# Patient Record
Sex: MALE | Race: ASIAN | Employment: PART TIME | URBAN - METROPOLITAN AREA
[De-identification: names, ages, dates, MRNs, and addresses within clinical notes are randomized per-mention and may not be internally consistent; named-entity substitution may affect disease eponyms.]

---

## 2020-12-08 ENCOUNTER — OFFICE VISIT (OUTPATIENT)
Dept: NEUROLOGY | Age: 25
End: 2020-12-08
Payer: COMMERCIAL

## 2020-12-08 VITALS
OXYGEN SATURATION: 98 % | HEART RATE: 73 BPM | WEIGHT: 204 LBS | BODY MASS INDEX: 33.99 KG/M2 | RESPIRATION RATE: 22 BRPM | SYSTOLIC BLOOD PRESSURE: 112 MMHG | TEMPERATURE: 96.7 F | HEIGHT: 65 IN | DIASTOLIC BLOOD PRESSURE: 80 MMHG

## 2020-12-08 DIAGNOSIS — R56.9 SEIZURE (HCC): Primary | ICD-10-CM

## 2020-12-08 PROCEDURE — 99203 OFFICE O/P NEW LOW 30 MIN: CPT | Performed by: STUDENT IN AN ORGANIZED HEALTH CARE EDUCATION/TRAINING PROGRAM

## 2020-12-08 RX ORDER — CHOLECALCIFEROL (VITAMIN D3) 50 MCG
CAPSULE ORAL
COMMUNITY

## 2020-12-08 RX ORDER — LEVETIRACETAM 750 MG/1
TABLET ORAL
COMMUNITY
Start: 2020-12-03 | End: 2020-12-08 | Stop reason: DRUGHIGH

## 2020-12-08 RX ORDER — ERGOCALCIFEROL 1.25 MG/1
CAPSULE ORAL
COMMUNITY
Start: 2020-09-24

## 2020-12-08 RX ORDER — LEVETIRACETAM 750 MG/1
750 TABLET ORAL 2 TIMES DAILY
Qty: 60 TAB | Refills: 3 | Status: SHIPPED | OUTPATIENT
Start: 2020-12-08 | End: 2021-01-07

## 2020-12-08 NOTE — PROGRESS NOTES
Kamille Castorena is a 31 y.o. male . presents for New Patient; Seizure; and Other (autism)   . A 31years old male patient with history of autism and seizure disorder currently on Keppra (500 mg p.o. twice daily) here for evaluation. He has been having seizures for about 4 years. Get worse over the past 1 year and has been started on Keppra about a year ago. His last seizure was in October 2020 (October 8). Seizure description is generalized tonic-clonic, with tongue bite, foaming from his mouth, with occasional incontinence. Duration is about 1 minutes with significant postictal confusion lasting for few minutes. Sometimes he goes to sleep. He feels weak all over after his seizures. He does not have any obvious warning symptoms. During his last visit to the emergency room at [facility], he had a seizure while in the car; he developed a posterior dislocation of the left shoulder which was treated by orthopedics. At that time, his discharge medication includes Keppra 1000 mg p.o. twice daily. But, mother is giving him 500 mg p.o. per day until about yesterday when she increase the dose to 500 mg p.o. twice daily. Current seizure frequency is about once every other month. Patient has autism but he is able to talk and communicate well with his family members. He has completed a school. Used to work at [employer] but stopped it because of his seizures. He spends a lot of time playing video games. He goes to sleep very late. Had a history of trauma to the head as a child when he fell from his father shoulder; no obvious loss of consciousness. No past history of bone infection. Family history includes a cousin with similar. No history of febrile seizure. No previous EEG or MRI. Had a CT scan of his head from June 2020 which was unremarkable. Review of Systems   Reason unable to perform ROS: Some limitation because of autism. Constitutional: Negative for chills, fever and weight loss. HENT: Negative for hearing loss. Eyes: Negative for blurred vision and double vision. Respiratory: Negative for cough and shortness of breath. Cardiovascular: Negative for chest pain and leg swelling. Gastrointestinal: Negative for nausea and vomiting. Genitourinary: Negative for dysuria and urgency. Musculoskeletal: Negative for back pain, falls and neck pain. Skin: Positive for itching and rash. Neurological: Positive for speech change (poor communication from autism). Negative for tremors, weakness and headaches. No past medical history on file. No past surgical history on file. No family history on file.      Social History     Socioeconomic History    Marital status: SINGLE     Spouse name: Not on file    Number of children: Not on file    Years of education: Not on file    Highest education level: Not on file   Occupational History    Not on file   Social Needs    Financial resource strain: Not on file    Food insecurity     Worry: Not on file     Inability: Not on file    Transportation needs     Medical: Not on file     Non-medical: Not on file   Tobacco Use    Smoking status: Never Smoker    Smokeless tobacco: Never Used   Substance and Sexual Activity    Alcohol use: Not Currently    Drug use: Never    Sexual activity: Not on file   Lifestyle    Physical activity     Days per week: Not on file     Minutes per session: Not on file    Stress: Not on file   Relationships    Social connections     Talks on phone: Not on file     Gets together: Not on file     Attends Rastafari service: Not on file     Active member of club or organization: Not on file     Attends meetings of clubs or organizations: Not on file     Relationship status: Not on file    Intimate partner violence     Fear of current or ex partner: Not on file     Emotionally abused: Not on file     Physically abused: Not on file     Forced sexual activity: Not on file   Other Topics Concern    Not on file   Social History Narrative    Not on file        Allergies not on file      Current Outpatient Medications   Medication Sig Dispense Refill    levETIRAcetam (KEPPRA) 750 mg tablet TK 1 T PO BID      ergocalciferol (ERGOCALCIFEROL) 1,250 mcg (50,000 unit) capsule TK ONE C PO ONCE A WEEK WITH FOOD      B.infantis-B.ani-B.long-B.bifi (Probiotic 4X) 10-15 mg TbEC Take  by mouth. Physical Exam  Constitutional:       Appearance: Normal appearance. HENT:      Head: Normocephalic and atraumatic. Mouth/Throat:      Mouth: Mucous membranes are moist.      Pharynx: Oropharynx is clear. No oropharyngeal exudate. Eyes:      Extraocular Movements: Extraocular movements intact. Pupils: Pupils are equal, round, and reactive to light. Neck:      Musculoskeletal: Normal range of motion and neck supple. Pulmonary:      Effort: Pulmonary effort is normal. No respiratory distress. Musculoskeletal: Normal range of motion. Right lower leg: No edema. Left lower leg: No edema. Neurological:      Mental Status: He is alert. Comments: Mental status: Awake, alert, oriented to self, his , place, Day/month/date/year and person; know the president; x3, follows simple and complex commands, no neglect. Poor eye contact. Speech and languge: fluent, coherent,  and comprehension intact  CN: VFF, EOMI, PERRLA, face sensation intact , no facial asymmetry noted, palate elevation symmetric bilat, SS+SCM 5/5 bilat, tongue midline  Motor: no pronator drift, tone normal throughout, strength 5/5 throughout  Sensory: intact to light touch and PP  throughout  Coordination: FNF, HS accurate w/o dysmetria  DTR: 2+ throughout, toes downgoing BL  Gait: normal.             No visits with results within 3 Month(s) from this visit. Latest known visit with results is:   No results found for any previous visit.              ICD-10-CM ICD-9-CM    1. Seizure ([facility] [address]  R56.9 780.39 levETIRAcetam (KEPPRA) 750 mg tablet      EEG AWAKE AND ASLEEP      MRI BRAIN WO CONT     A 31years old male patient with autism here for evaluation of seizure. Has been having seizures for the past 4 years and no medication for the past 1 year. Currently has seizures every other month. Description of the seizure is generalized tonic-clonic with tongue bite and incontinence. Also had sustained trauma to his head during seizures. Had a shoulder dislocation in October from seizure. Has not been taking his seizure medications as prescribed: Last order from October was 1000 mg p.o. twice daily; but patient was taking 500 mg p.o. per day until yesterday when the dose was increased to 500 mg p.o. twice daily. Since she had seizures despite his Keppra, will increase the dose to 750 mg p.o. twice daily. In addition, will get EEG and MRI of the brain. Discussed seizure precautions including not working on a roof tops, avoiding climbing ladders, working on open fires, and swimming. Patient is not currently driving. We will see him in 3 months time.

## 2020-12-08 NOTE — LETTER
12/8/20 Patient: Lilia Pedersen YOB: 1995 Date of Visit: 12/8/2020 Sylvia Grande NP 
[address] [facility] [address] [address] VIA Facsimile: [phone] Huey Barnhart NP 
[address] [id number] VIA Facsimile: [phone] Dear ERICH Matta NP, Thank you for referring Mr. Lilia Pedersen to [facility] for evaluation. My notes for this consultation are attached. If you have questions, please do not hesitate to call me. I look forward to following your patient along with you. Sincerely, Brayan Hager MD

## 2020-12-15 DIAGNOSIS — R56.9 SEIZURE (HCC): ICD-10-CM

## 2021-01-05 ENCOUNTER — HOSPITAL ENCOUNTER (OUTPATIENT)
Dept: NEUROLOGY | Age: 26
Discharge: HOME | End: 2021-01-05
Attending: STUDENT IN AN ORGANIZED HEALTH CARE EDUCATION/TRAINING PROGRAM
Payer: COMMERCIAL

## 2021-01-05 ENCOUNTER — HOSPITAL ENCOUNTER (OUTPATIENT)
Dept: MRI IMAGING | Age: 26
Discharge: HOME | End: 2021-01-05
Attending: STUDENT IN AN ORGANIZED HEALTH CARE EDUCATION/TRAINING PROGRAM
Payer: COMMERCIAL

## 2021-01-05 PROCEDURE — 70551 MRI BRAIN STEM W/O DYE: CPT

## 2021-01-05 PROCEDURE — 95816 EEG AWAKE AND DROWSY: CPT

## 2021-01-16 NOTE — PROCEDURES
[address] [place] [address] Dr NICHOLAS    Name:  Cassie Mg  MR#:   [id number]  :  1995  ACCOUNT #:  [de-identified]  DATE OF SERVICE:  2021    OUTPATIENT RECORDING    EEG Number:  AMANDA EEG [id number]    REFERRING PROVIDER:  EEG was ordered by Dr. Remy Fraser. REASON FOR EEG:  For evaluation of seizures. MEDICATIONS:  The patient's current medications include Keppra. EEG TECHNIQUE USED:  Standard 10-20 system with 21-channel recording and an EKG. Activation procedure used was photic stimulation. The patient did not sleep during the recording. Total duration of the EEG was 27 minutes. EEG REPORT:  The background consists of posterior dominant alpha rhythms at a frequency of 9-10 Hz and attenuates to eye opening. No asymmetry between the right and left sides. There are no obvious spikes or sharp wave discharges. No focal slowing. Photic stimulation did not induce any abnormal discharges. No sleep-related changes. IMPRESSION:  This is a normal EEG. CLINICAL CORRELATION:  A normal EEG does not rule out the possibility of seizures or epilepsy.       MD WESLEY [id number]  D:  01/15/2021 17:29  T:  2021 2:12  JOB #:  [id number]

## 2021-07-20 ENCOUNTER — OFFICE VISIT (OUTPATIENT)
Dept: NEUROLOGY | Age: 26
End: 2021-07-20
Payer: COMMERCIAL

## 2021-07-20 ENCOUNTER — HOSPITAL ENCOUNTER (OUTPATIENT)
Dept: LAB | Age: 26
Discharge: HOME | End: 2021-07-20
Payer: COMMERCIAL

## 2021-07-20 VITALS
WEIGHT: 214.4 LBS | OXYGEN SATURATION: 97 % | HEIGHT: 65 IN | BODY MASS INDEX: 35.72 KG/M2 | RESPIRATION RATE: 18 BRPM | DIASTOLIC BLOOD PRESSURE: 72 MMHG | HEART RATE: 95 BPM | SYSTOLIC BLOOD PRESSURE: 108 MMHG | TEMPERATURE: 97.3 F

## 2021-07-20 DIAGNOSIS — E23.6 PITUITARY MASS (HCC): ICD-10-CM

## 2021-07-20 DIAGNOSIS — R56.9 SEIZURE (HCC): ICD-10-CM

## 2021-07-20 DIAGNOSIS — R56.9 SEIZURE (HCC): Primary | ICD-10-CM

## 2021-07-20 LAB — TSH SERPL DL<=0.05 MIU/L-ACNC: 2 UIU/ML (ref 0.36–3.74)

## 2021-07-20 PROCEDURE — 84146 ASSAY OF PROLACTIN: CPT

## 2021-07-20 PROCEDURE — 36415 COLL VENOUS BLD VENIPUNCTURE: CPT

## 2021-07-20 PROCEDURE — 82024 ASSAY OF ACTH: CPT

## 2021-07-20 PROCEDURE — 80177 DRUG SCRN QUAN LEVETIRACETAM: CPT

## 2021-07-20 PROCEDURE — 84443 ASSAY THYROID STIM HORMONE: CPT

## 2021-07-20 PROCEDURE — 84305 ASSAY OF SOMATOMEDIN: CPT

## 2021-07-20 PROCEDURE — 82533 TOTAL CORTISOL: CPT

## 2021-07-20 PROCEDURE — 99214 OFFICE O/P EST MOD 30 MIN: CPT | Performed by: STUDENT IN AN ORGANIZED HEALTH CARE EDUCATION/TRAINING PROGRAM

## 2021-07-20 PROCEDURE — 83003 ASSAY GROWTH HORMONE (HGH): CPT

## 2021-07-20 RX ORDER — LEVETIRACETAM 750 MG/1
750 TABLET ORAL 2 TIMES DAILY
Qty: 60 TABLET | Refills: 6 | Status: SHIPPED | OUTPATIENT
Start: 2021-07-20 | End: 2021-08-19

## 2021-07-20 RX ORDER — LEVETIRACETAM 750 MG/1
TABLET ORAL
COMMUNITY
Start: 2021-06-21 | End: 2021-07-20 | Stop reason: SDUPTHER

## 2021-07-20 NOTE — PROGRESS NOTES
Sergio Peña is a 31 y.o. male . presents for Seizure and Medication Refill   . A 31years old male patient with autism spectrum disorder and seizure here for follow-up of his seizure. Last seen in the clinic in December 2020. He came today with his mother. According to her, he had an episode of seizure on July 15, 2021 which was witnessed by her boyfriend: He had a generalized convulsion with tongue bite; unknown duration. He was started after the seizure. She is not sure about the frequency of his seizure but that was the only witnessed seizure since his last visit. He is taking his Keppra 750 mg p.o. twice daily. Mother arranges the pills but sometimes he might forget. He currently works part-time at [employer]. Some problems with communication. During his last visit, and EEG and MRI were ordered. The EEG from January 2021 did not show any epileptiform discharges. MRI of the brain done in January showed a 3 mm T2 hyperintense cystic-appearing focus in the midline anterior pituitary gland; possibility of Rathke cyst was considered; the differential was cystic pituitary microadenoma. From previous encounter  A 31years old male patient with history of autism and seizure disorder currently on Keppra (500 mg p.o. twice daily) here for evaluation. He has been having seizures for about 4 years. Get worse over the past 1 year and has been started on Keppra about a year ago. His last seizure was in October 2020 (October 8). Seizure description is generalized tonic-clonic, with tongue bite, foaming from his mouth, with occasional incontinence. Duration is about 1 minutes with significant postictal confusion lasting for few minutes. Sometimes he goes to sleep. He feels weak all over after his seizures. He does not have any obvious warning symptoms.   During his last visit to the emergency room at [facility], he had a seizure while in the car; he developed a posterior dislocation of the left shoulder which was treated by orthopedics. At that time, his discharge medication includes Keppra 1000 mg p.o. twice daily. But, mother is giving him 500 mg p.o. per day until about yesterday when she increase the dose to 500 mg p.o. twice daily. Current seizure frequency is about once every other month. Patient has autism but he is able to talk and communicate well with his family members. He has completed a school. Used to work at [employer] but stopped it because of his seizures. He spends a lot of time playing video games. He goes to sleep very late. Had a history of trauma to the head as a child when he fell from his father shoulder; no obvious loss of consciousness. No past history of bone infection. Family history includes a cousin with similar. No history of febrile seizure. No previous EEG or MRI. Had a CT scan of his head from June 2020 which was unremarkable. Seizure   Pertinent negatives include no headaches, no chest pain, no cough, no nausea and no vomiting. He reports no chest pain, no vomiting, no headaches, no cough. Medication Refill  Pertinent negatives include no chest pain, no headaches and no shortness of breath. Review of Systems   Reason unable to perform ROS: Some limitation because of autism. Constitutional: Negative for chills, fever and weight loss. HENT: Negative for hearing loss. Eyes: Negative for blurred vision and double vision. Respiratory: Negative for cough and shortness of breath. Cardiovascular: Negative for chest pain and leg swelling. Gastrointestinal: Negative for nausea and vomiting. Genitourinary: Negative for dysuria and urgency. Musculoskeletal: Negative for back pain and neck pain. Skin: Positive for itching and rash. Neurological: Positive for speech change (poor communication from autism) and seizures (Last seizure was July 15). Negative for tremors, weakness and headaches. History reviewed.  No pertinent past medical history. History reviewed. No pertinent surgical history. History reviewed. No pertinent family history. Social History     Socioeconomic History    Marital status: SINGLE     Spouse name: Not on file    Number of children: Not on file    Years of education: Not on file    Highest education level: Not on file   Occupational History    Not on file   Tobacco Use    Smoking status: Never Smoker    Smokeless tobacco: Never Used   Substance and Sexual Activity    Alcohol use: Not Currently    Drug use: Never    Sexual activity: Not on file   Other Topics Concern    Not on file   Social History Narrative    Not on file     Social Determinants of Health     Financial Resource Strain:     Difficulty of Paying Living Expenses:    Food Insecurity:     Worried About Running Out of Food in the Last Year:     [address] in the Last Year:    Transportation Needs:     Lack of Transportation (Medical):  Lack of Transportation (Non-Medical):    Physical Activity:     Days of Exercise per Week:     Minutes of Exercise per Session:    Stress:     Feeling of Stress :    Social Connections:     Frequency of Communication with Friends and Family:     Frequency of Social Gatherings with Friends and Family:     Attends Alevism Services:     Active Member of Clubs or Organizations:     Attends Club or Organization Meetings:     Marital Status:    Intimate Partner Violence:     Fear of Current or Ex-Partner:     Emotionally Abused:     Physically Abused:     Sexually Abused:         No Known Allergies      Current Outpatient Medications   Medication Sig Dispense Refill    levETIRAcetam (KEPPRA) 750 mg tablet Take 1 Tablet by mouth two (2) times a day for 30 days. 60 Tablet 6    ergocalciferol (ERGOCALCIFEROL) 1,250 mcg (50,000 unit) capsule TK ONE C PO ONCE A WEEK WITH FOOD      B.infantis-B.ani-B.long-B.bifi (Probiotic 4X) 10-15 mg TbEC Take  by mouth.            Physical Exam  Constitutional:       Appearance: Normal appearance. HENT:      Head: Normocephalic and atraumatic. Mouth/Throat:      Mouth: Mucous membranes are moist.      Pharynx: Oropharynx is clear. No oropharyngeal exudate. Eyes:      Extraocular Movements: Extraocular movements intact. Pupils: Pupils are equal, round, and reactive to light. Pulmonary:      Effort: Pulmonary effort is normal. No respiratory distress. Musculoskeletal:         General: Normal range of motion. Cervical back: Normal range of motion and neck supple. Right lower leg: No edema. Left lower leg: No edema. Neurological:      Mental Status: He is alert. Comments: Mental status: Awake, alert, oriented x3, follows simple and complex commands, no neglect. Poor eye contact. Speech and languge: fluent, coherent,  and comprehension intact  CN: VFF, EOMI, PERRLA, face sensation intact , no facial asymmetry noted, palate elevation symmetric bilat, SS+SCM 5/5 bilat, tongue midline  Motor: no pronator drift, tone normal throughout, strength 5/5 throughout  Sensory: intact to light touch and PP  throughout  Coordination: FNF, HS accurate w/o dysmetria  DTR: 2+ throughout, toes downgoing BL  Gait: normal.             Hospital Outpatient Visit on 07/20/2021   Component Date Value Ref Range Status    TSH 07/20/2021 2.00  0.36 - 3.74 uIU/mL Final    Cortisol, random 07/20/2021 14.9  ug/dL Final    No reference range has been established.  Prolactin 07/20/2021 7.4  ng/mL Final    Males:            2.1-17.7    ng/mL     Study Result    Narrative & Impression   MRI BRAIN WITHOUT CONTRAST     PROVIDED REASON FOR EXAM: Evaluation of seizure  Additional History: History of autism, seizure disorder. Tonic-clonic seizures  for 4 years  Comparison Studies: No prior brain imaging available.  Report for CT head  6/17/2020 available in Care Everywhere     Imaging Technique:     Sequences:  Sagittal,  Coronal and axial T1 weighted, Diffusion Weighted  (DWI), Axial T2 weighted, Axial T2 FLAIR, and SWI/GRE MRI images of the brain. Additional thin coronal oblique T2 FSE and T2 FLAIR images through the temporal  lobes.     Contrast Material:  NONE     Limiting Factors/Major Artifacts: None.     FINDINGS:     Brain Parenchyma: Negative for acute infarct. Cerebral white matter is normal.  There are tiny T2 hyperintense defects in the left and right centrum semiovale. Visible as a well-defined T1 hypointense defect on sagittal and axial T1, no  significant surrounding gliosis on T2 FLAIR. May be enlarged perivascular space. No chronic cortical infarcts or chronic lacunar infarcts. No visible masses or  midline shift.     Dedicated coronal oblique images through the temporal lobes and whole brain. Symmetric appearance of the mesial temporal lobes, no findings of hippocampal  sclerosis. No findings of seizure focus. No visible heterotopia or cortical  dysplasia.     CSF Spaces:  Normal in size and morphology for the patient's age. No  hydrocephalus.     Vascular System:  Grossly patent flow in basilar and internal cerebral arteries. No findings of dural sinus thrombosis.      Hemorrhage:  No acute hemorrhage. No chronic microhemorrhage.     Other Structures:     Calvarium: No suspicious marrow signal.     Sella: Pituitary is not enlarged. There is a 3 mm T2 hyperintense signal focus  in the midline of the anterior pituitary seen on coronal T2 series 12 image 38. Slightly hyperintense on T2 FLAIR. Visualized Upper Cervical Spine: Cerebellar tonsils extend 5 mm below the  foramen magnum. The tonsils do not compress the cervical medullary junction. Not  significant crowding of the foramen magnum. Orbits: Grossly normal  Paranasal Sinuses: No significant paranasal sinus disease. Mastoid Air Cells:  Clear.     IMPRESSION  IMPRESSION:     1. Negative for acute infarct, acute hemorrhage, or mass.   2. No focal brain abnormality to explain seizures. 3. Small T2 hyperintense linear signal foci in the left and right frontal white  matter most consistent with incidental enlarged perivascular spaces. 4. 3 mm T2 hyperintense cystic-appearing focus in the midline anterior pituitary  gland. Nonspecific with routine noncontrast brain MRI. Could represent a benign  pituitary cyst such as Rathke's cleft cyst. A cystic pituitary microadenoma is  possible, typically seen more laterally within the pituitary gland, however. Pituitary-related endocrine labs to exclude a functioning adenoma             ICD-10-CM ICD-9-CM    1. Seizure ([address]  R56.9 780.39 levETIRAcetam (KEPPRA) 750 mg tablet      LEVETIRACETAM (KEPPRA)   2. Pituitary mass (HCC)  E23.6 253.8 ACTH      TSH 3RD GENERATION      CORTISOL      INSULIN-LIKE GROWTH FACTOR 1      GROWTH HORMONE      PROLACTIN     [age]years old male patient here for follow-up of seizure. Currently on Keppra 750 mg p.o. twice daily. Had one seizure on July 15, 2021 which was described as generalized convulsion with tongue bite and postictal confusion. Questionable compliance and mother mentions that he sometimes might forget. We will check his Keppra level. 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 Will get pituitary hormone levels. Patient is not currently driving. We will see him in 6 months time but will call him with the result of the hormonal studies.

## 2021-07-20 NOTE — PROGRESS NOTES
Shannon Gallegos is a 31 y.o. male patient in office today, accompanied by mother for follow-up on seizures and medication refills. 1. Have you been to the ER, urgent care clinic since your last visit? Hospitalized since your last visit? No    2. Have you seen or consulted any other health care providers outside of the [address] [place] [address] since your last visit? Include any pap smears or colon screening.  No

## 2021-07-21 LAB
ACTH PLAS-MCNC: 32.5 PG/ML (ref 7.2–63.3)
CORTIS SERPL-MCNC: 14.9 UG/DL
GH SERPL-MCNC: <0.1 NG/ML (ref 0–10)
IGF-I SERPL-MCNC: 143 NG/ML (ref 101–307)
PROLACTIN SERPL-MCNC: 7.4 NG/ML

## 2021-07-22 LAB — LEVETIRACETAM SERPL-MCNC: 15 UG/ML (ref 10–40)

## 2022-03-03 RX ORDER — LEVETIRACETAM 750 MG/1
750 TABLET ORAL 2 TIMES DAILY
COMMUNITY

## 2024-05-09 ENCOUNTER — HOSPITAL ENCOUNTER (OUTPATIENT)
Facility: HOSPITAL | Age: 29
Setting detail: SPECIMEN
Discharge: HOME | End: 2024-05-09
Payer: COMMERCIAL

## 2024-05-09 ENCOUNTER — OFFICE VISIT (OUTPATIENT)
Dept: FAMILY MEDICINE CLINIC | Facility: CLINIC | Age: 29
End: 2024-05-09

## 2024-05-09 VITALS
BODY MASS INDEX: 31.58 KG/M2 | HEIGHT: 64 IN | TEMPERATURE: 97.2 F | HEART RATE: 67 BPM | RESPIRATION RATE: 14 BRPM | WEIGHT: 185 LBS | DIASTOLIC BLOOD PRESSURE: 82 MMHG | OXYGEN SATURATION: 95 % | SYSTOLIC BLOOD PRESSURE: 119 MMHG

## 2024-05-09 DIAGNOSIS — G40.909 SEIZURE DISORDER (HCC): ICD-10-CM

## 2024-05-09 DIAGNOSIS — Z76.89 ESTABLISHING CARE WITH NEW DOCTOR, ENCOUNTER FOR: Primary | ICD-10-CM

## 2024-05-09 DIAGNOSIS — Z76.89 ESTABLISHING CARE WITH NEW DOCTOR, ENCOUNTER FOR: ICD-10-CM

## 2024-05-09 DIAGNOSIS — F84.0 AUTISM: ICD-10-CM

## 2024-05-09 LAB
ANION GAP SERPL CALC-SCNC: 5 MMOL/L (ref 3–18)
BUN SERPL-MCNC: 11 MG/DL (ref 7–18)
BUN/CREAT SERPL: 11 (ref 12–20)
CALCIUM SERPL-MCNC: 9.5 MG/DL (ref 8.5–10.1)
CHLORIDE SERPL-SCNC: 105 MMOL/L (ref 100–111)
CHOLEST SERPL-MCNC: 154 MG/DL
CO2 SERPL-SCNC: 29 MMOL/L (ref 21–32)
CREAT SERPL-MCNC: 1.01 MG/DL (ref 0.6–1.3)
ERYTHROCYTE [DISTWIDTH] IN BLOOD BY AUTOMATED COUNT: 12 % (ref 11.6–14.5)
EST. AVERAGE GLUCOSE BLD GHB EST-MCNC: 105 MG/DL
GLUCOSE SERPL-MCNC: 92 MG/DL (ref 74–99)
HBA1C MFR BLD: 5.3 % (ref 4.2–5.6)
HCT VFR BLD AUTO: 49.7 % (ref 36–48)
HDLC SERPL-MCNC: 52 MG/DL (ref 40–60)
HDLC SERPL: 3 (ref 0–5)
HGB BLD-MCNC: 17.1 G/DL (ref 13–16)
LDLC SERPL CALC-MCNC: 81.2 MG/DL (ref 0–100)
LIPID PANEL: NORMAL
MCH RBC QN AUTO: 31.3 PG (ref 24–34)
MCHC RBC AUTO-ENTMCNC: 34.4 G/DL (ref 31–37)
MCV RBC AUTO: 91 FL (ref 78–100)
NRBC # BLD: 0 K/UL (ref 0–0.01)
NRBC BLD-RTO: 0 PER 100 WBC
PLATELET # BLD AUTO: 317 K/UL (ref 135–420)
PMV BLD AUTO: 10.2 FL (ref 9.2–11.8)
POTASSIUM SERPL-SCNC: 3.8 MMOL/L (ref 3.5–5.5)
RBC # BLD AUTO: 5.46 M/UL (ref 4.35–5.65)
SODIUM SERPL-SCNC: 139 MMOL/L (ref 136–145)
TRIGL SERPL-MCNC: 104 MG/DL
VLDLC SERPL CALC-MCNC: 20.8 MG/DL
WBC # BLD AUTO: 6.2 K/UL (ref 4.6–13.2)

## 2024-05-09 PROCEDURE — 83036 HEMOGLOBIN GLYCOSYLATED A1C: CPT

## 2024-05-09 PROCEDURE — 80061 LIPID PANEL: CPT

## 2024-05-09 PROCEDURE — 80048 BASIC METABOLIC PNL TOTAL CA: CPT

## 2024-05-09 PROCEDURE — 36415 COLL VENOUS BLD VENIPUNCTURE: CPT

## 2024-05-09 PROCEDURE — 85027 COMPLETE CBC AUTOMATED: CPT

## 2024-05-09 PROCEDURE — 87389 HIV-1 AG W/HIV-1&-2 AB AG IA: CPT

## 2024-05-09 RX ORDER — LEVETIRACETAM 750 MG/1
750 TABLET ORAL 2 TIMES DAILY
Qty: 180 TABLET | Refills: 1 | Status: SHIPPED | OUTPATIENT
Start: 2024-05-09

## 2024-05-09 SDOH — ECONOMIC STABILITY: FOOD INSECURITY
WITHIN THE PAST 12 MONTHS, YOU WORRIED THAT YOUR FOOD WOULD RUN OUT BEFORE YOU GOT MONEY TO BUY MORE.: PATIENT UNABLE TO ANSWER

## 2024-05-09 SDOH — ECONOMIC STABILITY: HOUSING INSECURITY
IN THE LAST 12 MONTHS, WAS THERE A TIME WHEN YOU DID NOT HAVE A STEADY PLACE TO SLEEP OR SLEPT IN A SHELTER (INCLUDING NOW)?: PATIENT UNABLE TO ANSWER

## 2024-05-09 SDOH — ECONOMIC STABILITY: FOOD INSECURITY
WITHIN THE PAST 12 MONTHS, THE FOOD YOU BOUGHT JUST DIDN'T LAST AND YOU DIDN'T HAVE MONEY TO GET MORE.: PATIENT UNABLE TO ANSWER

## 2024-05-09 ASSESSMENT — PATIENT HEALTH QUESTIONNAIRE - PHQ9
SUM OF ALL RESPONSES TO PHQ QUESTIONS 1-9: 0
SUM OF ALL RESPONSES TO PHQ QUESTIONS 1-9: 0
1. LITTLE INTEREST OR PLEASURE IN DOING THINGS: NOT AT ALL
SUM OF ALL RESPONSES TO PHQ QUESTIONS 1-9: 0
SUM OF ALL RESPONSES TO PHQ QUESTIONS 1-9: 0
2. FEELING DOWN, DEPRESSED OR HOPELESS: NOT AT ALL
DEPRESSION UNABLE TO ASSESS: FUNCTIONAL CAPACITY MOTIVATION LIMITS ACCURACY
SUM OF ALL RESPONSES TO PHQ9 QUESTIONS 1 & 2: 0

## 2024-05-09 ASSESSMENT — ENCOUNTER SYMPTOMS
NAUSEA: 0
VOMITING: 0
COUGH: 0
DIARRHEA: 0
SHORTNESS OF BREATH: 0
CHEST TIGHTNESS: 0

## 2024-05-09 NOTE — PROGRESS NOTES
Fortunato Tolentino is a 29 y.o. male (: 1995) presenting to address:    Chief Complaint   Patient presents with    New Patient       Vitals:    24 1340   BP: 119/82   Pulse: 67   Resp: 14   Temp: 97.2 °F (36.2 °C)   SpO2: 95%       \"Have you been to the ER, urgent care clinic since your last visit?  Hospitalized since your last visit?\"    NO    “Have you seen or consulted any other health care providers outside of [facility] since your last visit?”    NO

## 2024-05-09 NOTE — PROGRESS NOTES
[facility]    HISTORY OF PRESENT ILLNESS  Fortunato Tolentino  is a 29 y.o. y.o. male here to establish care.    Seizures  -taking keppra 750  -never followed neurology  -started about 7 years ago     Hx of autism    Health Maintenance:    Depression Screen:       5/9/2024     1:39 PM   PHQ-9    Depression Unable to Assess Functional capacity motivation limits accuracy   Little interest or pleasure in doing things 0   Feeling down, depressed, or hopeless 0   PHQ-2 Score 0   PHQ-9 Total Score 0        HCV Screen:   No results found for: \"HEPCAB\"     Colon Cancer Screening: n/a  Prostate Cancer Screening:       Smoking Status:   Tobacco Use      Smoking status: Never      Smokeless tobacco: Never     Lung Cancer Screening:  CT Low Dose n/a  AAA Screening: n/a    Exercise:  Diet:    Immunizations:  Flu vaccine- Recommended every fall  COVID vaccine primary series- complete  Tetanus- Tdap   Shingrix- series not completed  Pneumovax 23-  N/A  Prevnar 20- at age 64          Mr#: [id number]      Past Medical History:   Diagnosis Date    Seizure (HCC)        History reviewed. No pertinent surgical history.    History reviewed. No pertinent family history.    No Known Allergies    Social History     Tobacco Use   Smoking Status Never    Passive exposure: Never   Smokeless Tobacco Never       Social History     Substance and Sexual Activity   Alcohol Use Not Currently       There is no immunization history for the selected administration types on file for this patient.    There is no problem list on file for this patient.        Current Outpatient Medications:     levETIRAcetam (KEPPRA) 750 MG tablet, Take 1 tablet by mouth 2 times daily, Disp: 180 tablet, Rfl: 1      Review of Systems   Constitutional:  Negative for activity change and appetite change.   HENT:  Negative for congestion.    Respiratory:  Negative for cough, chest tightness and shortness of breath.    Cardiovascular:  Negative for chest pain

## 2024-05-09 NOTE — PROGRESS NOTES
Immunizations Administered       Name Date Dose Route    HPV, GARDASIL 9, (age 9y-45y), IM, 0.5mL 5/9/2024 0.5 mL Intramuscular    Site: Deltoid- Left    Lot: [id number]    NDC: 1471-1126-52

## 2024-05-10 LAB
HIV 1+2 AB+HIV1 P24 AG SERPL QL IA: NONREACTIVE
HIV 1/2 RESULT COMMENT: NORMAL

## 2024-09-26 ENCOUNTER — TELEPHONE (OUTPATIENT)
Dept: FAMILY MEDICINE CLINIC | Facility: CLINIC | Age: 29
End: 2024-09-26

## 2024-10-11 ENCOUNTER — OFFICE VISIT (OUTPATIENT)
Dept: FAMILY MEDICINE CLINIC | Facility: CLINIC | Age: 29
End: 2024-10-11

## 2024-10-11 VITALS
HEART RATE: 66 BPM | TEMPERATURE: 97.3 F | HEIGHT: 64 IN | OXYGEN SATURATION: 98 % | SYSTOLIC BLOOD PRESSURE: 117 MMHG | RESPIRATION RATE: 16 BRPM | DIASTOLIC BLOOD PRESSURE: 81 MMHG | WEIGHT: 174 LBS | BODY MASS INDEX: 29.71 KG/M2

## 2024-10-11 DIAGNOSIS — L40.9 PSORIASIS: Primary | ICD-10-CM

## 2024-10-11 DIAGNOSIS — Z23 NEEDS FLU SHOT: ICD-10-CM

## 2024-10-11 DIAGNOSIS — R56.9 SEIZURES (HCC): ICD-10-CM

## 2024-10-11 RX ORDER — LEVETIRACETAM 750 MG/1
750 TABLET ORAL 2 TIMES DAILY
Qty: 180 TABLET | Refills: 1 | Status: SHIPPED | OUTPATIENT
Start: 2024-10-11

## 2024-10-11 RX ORDER — TRIAMCINOLONE ACETONIDE 0.25 MG/G
OINTMENT TOPICAL
Qty: 454 G | Refills: 1 | Status: SHIPPED | OUTPATIENT
Start: 2024-10-11 | End: 2024-10-18

## 2024-10-11 ASSESSMENT — ENCOUNTER SYMPTOMS
COUGH: 0
DIARRHEA: 0
SHORTNESS OF BREATH: 0
NAUSEA: 0
VOMITING: 0
CHEST TIGHTNESS: 0

## 2024-10-11 NOTE — PROGRESS NOTES
Fortunato Tolentino is a 29 y.o. male (: 1995) presenting to address:    Chief Complaint   Patient presents with    Seizures     Had another seizure a couple of weeks ago .     Rash     On back of left leg .     Immunizations     Would like flu shot .        Vitals:    10/11/24 0946   BP: 117/81   Pulse: 66   Resp: 16   Temp: 97.3 °F (36.3 °C)   SpO2: 98%       \"Have you been to the ER, urgent care clinic since your last visit?  Hospitalized since your last visit?\"    NO    “Have you seen or consulted any other health care providers outside of [facility] since your last visit?”    NO    Flu shot Immunization/s administered 10/11/2024 by Ivette Graves LPN   Patient tolerated procedure well.  No reactions noted.

## 2024-10-11 NOTE — PROGRESS NOTES
[facility] [place]    HISTORY OF PRESENT ILLNESS  Fortunato Tolentino  is a 29 y.o. y.o. male here for FU.    Seizures  -taking keppra 750  -used to follow neurology in [place] a few years ago  -started about 7 years ago   -had a Seizure a few weeks ago, but may have missed a dose of keppra that day    Rash on calf L leg  -itchy  -patches on groin as well  -scratches at them    Hx of autism     Mr#: [id number]      Past Medical History:   Diagnosis Date    Seizure (HCC)        History reviewed. No pertinent surgical history.    History reviewed. No pertinent family history.    No Known Allergies    Social History     Tobacco Use   Smoking Status Never    Passive exposure: Never   Smokeless Tobacco Never       Social History     Substance and Sexual Activity   Alcohol Use Not Currently       Immunization History   Administered Date(s) Administered    HPV, GARDASIL 9, (age 9y-45y), IM, 0.5mL 05/09/2024       There is no problem list on file for this patient.        Current Outpatient Medications:     levETIRAcetam (KEPPRA) 750 MG tablet, Take 1 tablet by mouth 2 times daily, Disp: 180 tablet, Rfl: 1    triamcinolone (KENALOG) 0.025 % ointment, Apply topically 2 times daily., Disp: 454 g, Rfl: 1      Review of Systems   Constitutional:  Negative for activity change and appetite change.   HENT:  Negative for congestion.    Respiratory:  Negative for cough, chest tightness and shortness of breath.    Cardiovascular:  Negative for chest pain and palpitations.   Gastrointestinal:  Negative for diarrhea, nausea and vomiting.   Neurological:  Negative for dizziness and seizures.   Psychiatric/Behavioral:  Negative for behavioral problems.        Physical Exam  Constitutional:       General: He is not in acute distress.     Appearance: Normal appearance. He is not ill-appearing.      Comments: Avoids eye contact   HENT:      Head: Normocephalic and atraumatic.   Eyes:      Extraocular Movements: Extraocular

## 2025-06-10 NOTE — TELEPHONE ENCOUNTER
Pt called requesting a refill to be sent to the [facility] on file.    Requested Prescriptions     Pending Prescriptions Disp Refills    levETIRAcetam (KEPPRA) 750 MG tablet 180 tablet 1     Sig: Take 1 tablet by mouth 2 times daily

## 2025-06-12 RX ORDER — LEVETIRACETAM 750 MG/1
750 TABLET ORAL 2 TIMES DAILY
Qty: 180 TABLET | Refills: 1 | Status: SHIPPED | OUTPATIENT
Start: 2025-06-12

## 2025-06-27 ENCOUNTER — OFFICE VISIT (OUTPATIENT)
Dept: FAMILY MEDICINE CLINIC | Facility: CLINIC | Age: 30
End: 2025-06-27
Payer: MEDICARE

## 2025-06-27 VITALS
DIASTOLIC BLOOD PRESSURE: 74 MMHG | BODY MASS INDEX: 30.63 KG/M2 | HEIGHT: 64 IN | HEART RATE: 65 BPM | SYSTOLIC BLOOD PRESSURE: 110 MMHG | TEMPERATURE: 97.3 F | RESPIRATION RATE: 16 BRPM | WEIGHT: 179.4 LBS | OXYGEN SATURATION: 98 %

## 2025-06-27 DIAGNOSIS — R56.9 SEIZURES (HCC): Primary | ICD-10-CM

## 2025-06-27 DIAGNOSIS — Z00.00 ANNUAL PHYSICAL EXAM: ICD-10-CM

## 2025-06-27 DIAGNOSIS — L40.9 PSORIASIS: ICD-10-CM

## 2025-06-27 PROCEDURE — 99395 PREV VISIT EST AGE 18-39: CPT | Performed by: STUDENT IN AN ORGANIZED HEALTH CARE EDUCATION/TRAINING PROGRAM

## 2025-06-27 PROCEDURE — 99214 OFFICE O/P EST MOD 30 MIN: CPT | Performed by: STUDENT IN AN ORGANIZED HEALTH CARE EDUCATION/TRAINING PROGRAM

## 2025-06-27 PROCEDURE — 90471 IMMUNIZATION ADMIN: CPT | Performed by: STUDENT IN AN ORGANIZED HEALTH CARE EDUCATION/TRAINING PROGRAM

## 2025-06-27 PROCEDURE — 90651 9VHPV VACCINE 2/3 DOSE IM: CPT | Performed by: STUDENT IN AN ORGANIZED HEALTH CARE EDUCATION/TRAINING PROGRAM

## 2025-06-27 PROCEDURE — 90472 IMMUNIZATION ADMIN EACH ADD: CPT | Performed by: STUDENT IN AN ORGANIZED HEALTH CARE EDUCATION/TRAINING PROGRAM

## 2025-06-27 PROCEDURE — 90715 TDAP VACCINE 7 YRS/> IM: CPT | Performed by: STUDENT IN AN ORGANIZED HEALTH CARE EDUCATION/TRAINING PROGRAM

## 2025-06-27 RX ORDER — LEVETIRACETAM 750 MG/1
750 TABLET ORAL 2 TIMES DAILY
Qty: 180 TABLET | Refills: 1 | Status: SHIPPED | OUTPATIENT
Start: 2025-06-27

## 2025-06-27 RX ORDER — TRIAMCINOLONE ACETONIDE 1 MG/G
OINTMENT TOPICAL 2 TIMES DAILY
Qty: 80 G | Refills: 1 | Status: SHIPPED | OUTPATIENT
Start: 2025-06-27 | End: 2025-07-04

## 2025-06-27 SDOH — ECONOMIC STABILITY: FOOD INSECURITY: WITHIN THE PAST 12 MONTHS, YOU WORRIED THAT YOUR FOOD WOULD RUN OUT BEFORE YOU GOT MONEY TO BUY MORE.: NEVER TRUE

## 2025-06-27 SDOH — ECONOMIC STABILITY: FOOD INSECURITY: WITHIN THE PAST 12 MONTHS, THE FOOD YOU BOUGHT JUST DIDN'T LAST AND YOU DIDN'T HAVE MONEY TO GET MORE.: NEVER TRUE

## 2025-06-27 ASSESSMENT — PATIENT HEALTH QUESTIONNAIRE - PHQ9
SUM OF ALL RESPONSES TO PHQ QUESTIONS 1-9: 0
2. FEELING DOWN, DEPRESSED OR HOPELESS: NOT AT ALL
1. LITTLE INTEREST OR PLEASURE IN DOING THINGS: NOT AT ALL
SUM OF ALL RESPONSES TO PHQ QUESTIONS 1-9: 0

## 2025-06-27 ASSESSMENT — ENCOUNTER SYMPTOMS
VOMITING: 0
SHORTNESS OF BREATH: 0
DIARRHEA: 0
CHEST TIGHTNESS: 0
NAUSEA: 0
COUGH: 0

## 2025-06-27 NOTE — PROGRESS NOTES
Fortunato Tolentino is a 30 y.o. male (: 1995) presenting to address:    Chief Complaint   Patient presents with    Follow-up       Vitals:    25 0956   BP: 110/74   Pulse: 65   Resp: 16   Temp: 97.3 °F (36.3 °C)   SpO2: 98%       \"Have you been to the ER, urgent care clinic since your last visit?  Hospitalized since your last visit?\"    YES - When: approximately 8 months ago.  Where and Why: ER for Epilepsy.    “Have you seen or consulted any other health care providers outside of [facility] since your last visit?”    NO

## 2025-06-27 NOTE — PROGRESS NOTES
[facility]    HISTORY OF PRESENT ILLNESS  Fortunato Tolentino  is a 30 y.o. y.o. male here for FU.    Seizures  -taking keppra 750 BID  -was taking once a day but still had breakthrough seizures  -used to follow neurology in [place] a few years ago  -started about 7 years ago   -referred last year, neurologist did not accept his insurance    Psoriasis  -improved with triamcinolone cream  -ran out and has since worsened    Hx of autism     Health Maintenance:    Depression Screen:       6/27/2025     9:56 AM   PHQ-9    Little interest or pleasure in doing things 0   Feeling down, depressed, or hopeless 0   PHQ-2 Score 0   PHQ-9 Total Score 0        HCV Screen:   No results found for: \"HEPCAB\"     Colon Cancer Screening: n/a  Prostate Cancer Screening:       Smoking Status:   Tobacco Use      Smoking status: Never        Passive exposure: Never      Smokeless tobacco: Never     Lung Cancer Screening:  CT Low Dose n/a  AAA Screening: n/a    Exercise:  Diet:    Immunizations:  Flu vaccine- Recommended every fall  COVID vaccine primary series- complete  Tetanus- Tdap   Shingrix- series not completed  Pneumovax 23-  N/A  Prevnar 20- at age 64     Mr#: [id number]      Past Medical History:   Diagnosis Date    Seizure (HCC)        History reviewed. No pertinent surgical history.    History reviewed. No pertinent family history.    No Known Allergies    Social History     Tobacco Use   Smoking Status Never    Passive exposure: Never   Smokeless Tobacco Never       Social History     Substance and Sexual Activity   Alcohol Use Not Currently       Immunization History   Administered Date(s) Administered    HPV, GARDASIL 9, (age 9y-45y), IM, 0.5mL 05/09/2024, 06/27/2025    Influenza, FLUCELVAX, (age 6 mo+) IM, Trivalent PF, 0.5mL 10/11/2024    TDaP, ADACEL (age 10y-64y), BOOSTRIX (age 10y+), IM, 0.5mL 06/27/2025       There is no problem list on file for this patient.        Current Outpatient Medications: